# Patient Record
Sex: MALE | Race: WHITE | NOT HISPANIC OR LATINO | Employment: FULL TIME | URBAN - METROPOLITAN AREA
[De-identification: names, ages, dates, MRNs, and addresses within clinical notes are randomized per-mention and may not be internally consistent; named-entity substitution may affect disease eponyms.]

---

## 2024-10-18 ENCOUNTER — HOSPITAL ENCOUNTER (EMERGENCY)
Facility: HOSPITAL | Age: 24
Discharge: HOME | End: 2024-10-18
Payer: COMMERCIAL

## 2024-10-18 ENCOUNTER — APPOINTMENT (OUTPATIENT)
Dept: RADIOLOGY | Facility: HOSPITAL | Age: 24
End: 2024-10-18
Payer: COMMERCIAL

## 2024-10-18 VITALS
DIASTOLIC BLOOD PRESSURE: 73 MMHG | HEART RATE: 57 BPM | WEIGHT: 212.74 LBS | TEMPERATURE: 97.9 F | OXYGEN SATURATION: 99 % | SYSTOLIC BLOOD PRESSURE: 131 MMHG | RESPIRATION RATE: 16 BRPM

## 2024-10-18 DIAGNOSIS — M79.672 LEFT FOOT PAIN: Primary | ICD-10-CM

## 2024-10-18 PROCEDURE — 73630 X-RAY EXAM OF FOOT: CPT | Mod: LT

## 2024-10-18 PROCEDURE — 73630 X-RAY EXAM OF FOOT: CPT | Mod: LEFT SIDE | Performed by: RADIOLOGY

## 2024-10-18 PROCEDURE — 99283 EMERGENCY DEPT VISIT LOW MDM: CPT

## 2024-10-18 RX ORDER — IBUPROFEN 600 MG/1
600 TABLET ORAL EVERY 6 HOURS PRN
Qty: 28 TABLET | Refills: 0 | Status: SHIPPED | OUTPATIENT
Start: 2024-10-18 | End: 2024-10-25

## 2024-10-18 NOTE — ED PROVIDER NOTES
HPI   Chief Complaint   Patient presents with    Foot Injury     Patient arrives with complaints of left foot pain after work yesterday. Denies injury. Patient speaks [place]       Patient is a 23-year-old male who presents ED today due to left foot pain.  Patient denies injury but states that his foot was really bothering him after work yesterday and is bothering him again today.  He has taken nothing for the pain without relief of symptoms.  He wants to make sure nothing is broken.  Patient has any distal numbness or weakness.      History provided by:  Patient   used: No (Patient is Malaysian speaking but prefers to use a  casi on his phone, Georgeti was offered but he declined.)            Patient History   No past medical history on file.  No past surgical history on file.  No family history on file.  Social History     Tobacco Use    Smoking status: Not on file    Smokeless tobacco: Not on file   Substance Use Topics    Alcohol use: Not on file    Drug use: Not on file       Physical Exam   ED Triage Vitals [10/18/24 0921]   Temperature Heart Rate Respirations BP   36.6 °C (97.9 °F) 57 16 131/73      Pulse Ox Temp Source Heart Rate Source Patient Position   99 % Temporal Monitor Sitting      BP Location FiO2 (%)     Right arm --       Physical Exam  Vitals and nursing note reviewed.   Constitutional:       General: He is not in acute distress.     Appearance: He is well-developed.   HENT:      Head: Normocephalic and atraumatic.   Eyes:      Conjunctiva/sclera: Conjunctivae normal.   Cardiovascular:      Rate and Rhythm: Normal rate and regular rhythm.      Heart sounds: No murmur heard.  Pulmonary:      Effort: Pulmonary effort is normal. No respiratory distress.      Breath sounds: Normal breath sounds.   Abdominal:      Palpations: Abdomen is soft.      Tenderness: There is no abdominal tenderness.   Musculoskeletal:         General: No swelling.      Cervical back: Neck supple.       Right hip: Normal. No tenderness. Normal range of motion.      Left hip: Normal. No tenderness. Normal range of motion.      Right upper leg: Normal. No tenderness.      Left upper leg: Normal. No tenderness.      Right knee: Normal. Normal range of motion. No tenderness.      Left knee: Normal. Normal range of motion. No tenderness.      Right lower leg: Normal. No swelling or tenderness.      Left lower leg: Normal. No swelling or tenderness.      Right ankle: Normal. Normal range of motion. Normal pulse.      Right Achilles Tendon: Normal.      Left ankle: Normal. Normal range of motion. Normal pulse.      Left Achilles Tendon: Normal.      Right foot: Normal. Normal capillary refill. No tenderness. Normal pulse.      Left foot: Normal. Normal capillary refill. No tenderness. Normal pulse.        Feet:    Feet:      Comments: Tenderness along the left fifth proximal metatarsal  Skin:     General: Skin is warm and dry.      Capillary Refill: Capillary refill takes less than 2 seconds.   Neurological:      Mental Status: He is alert.   Psychiatric:         Mood and Affect: Mood normal.           ED Course & MDM   ED Course as of 10/18/24 1031   Fri Oct 18, 2024   1016 XR foot left 3+ views  No acute fracture or malalignment.  Mild soft tissue swelling. [WS]      ED Course User Index  [WS] Watson Garner, APRN-CNP         Diagnoses as of 10/18/24 1031   Left foot pain                 No data recorded     [place] Coma Scale Score: 15 (10/18/24 0927 : Katiuska Robins RN)                           Medical Decision Making    Medical Decision Making & ED Course  Medical Decision Making:  Patient is a 23-year-old male who presents ED today due to left foot pain.  Patient denies injury but states that his foot was really bothering him after work yesterday and is bothering him again today.  He has taken nothing for the pain without relief of symptoms.  He wants to make sure nothing is broken.  Patient has any distal  numbness or weakness.  --  Differential diagnoses considered include but are not limited to: Foot fracture, foot sprain, plantar fasciitis.     Social Determinants of Health which Significantly Impact Care: None identified     EKG Independent Interpretation: EKG not obtained    Independent Result Review and Interpretation: Relevant laboratory and radiographic results were reviewed and independently interpreted by myself.  As necessary, they are commented on in the ED Course.    Chronic conditions affecting the patient's care: None    The patient was discussed with the following consultants/services: None    Care Considerations: As documented above in MDM    ED Course:     Disposition  As a result of the work-up, the patient was discharged home.  he was informed of his diagnosis and instructed to come back with any concerns or worsening of condition.  he and was agreeable to the plan as discussed above.  he was given the opportunity to ask questions.  All of the patient's questions were answered.      Patient was seen independently    [facility]  Emergency Medicine       Amount and/or Complexity of Data Reviewed  Radiology: ordered and independent interpretation performed. Decision-making details documented in ED Course.    Risk  OTC drugs.  Prescription drug management.        Procedure  Procedures     [facility]  10/18/24 1042

## 2024-10-18 NOTE — ED TRIAGE NOTES
Patient arrives with complaints of left foot pain after work yesterday. Denies injury. Patient speaks [place]o